# Patient Record
Sex: FEMALE | URBAN - METROPOLITAN AREA
[De-identification: names, ages, dates, MRNs, and addresses within clinical notes are randomized per-mention and may not be internally consistent; named-entity substitution may affect disease eponyms.]

---

## 2022-07-27 ENCOUNTER — OFFICE VISIT (OUTPATIENT)
Dept: URBAN - METROPOLITAN AREA CLINIC 30 | Facility: CLINIC | Age: 82
End: 2022-07-27
Payer: MEDICARE

## 2022-07-27 DIAGNOSIS — H43.391 OTHER VITREOUS OPACITIES, RIGHT EYE: ICD-10-CM

## 2022-07-27 DIAGNOSIS — H02.032 SENILE ENTROPION OF RIGHT LOWER EYELID: Primary | ICD-10-CM

## 2022-07-27 DIAGNOSIS — H01.02A SQUAMOUS BLEPHARITIS RIGHT EYE, UPPER AND LOWER EYELIDS: ICD-10-CM

## 2022-07-27 DIAGNOSIS — H01.02B SQUAMOUS BLEPHARITIS LEFT EYE, UPPER AND LOWER EYELIDS: ICD-10-CM

## 2022-07-27 DIAGNOSIS — H25.813 COMBINED FORMS OF AGE-RELATED CATARACT, BILATERAL: ICD-10-CM

## 2022-07-27 PROCEDURE — 92004 COMPRE OPH EXAM NEW PT 1/>: CPT | Performed by: STUDENT IN AN ORGANIZED HEALTH CARE EDUCATION/TRAINING PROGRAM

## 2022-07-27 PROCEDURE — 92134 CPTRZ OPH DX IMG PST SGM RTA: CPT | Performed by: STUDENT IN AN ORGANIZED HEALTH CARE EDUCATION/TRAINING PROGRAM

## 2022-07-27 ASSESSMENT — INTRAOCULAR PRESSURE
OS: 12
OD: 12

## 2022-07-27 NOTE — IMPRESSION/PLAN
Impression: Squamous blepharitis right eye, upper and lower eyelids: H01.02A.  Plan: see other blepharitis plan

## 2022-07-27 NOTE — IMPRESSION/PLAN
Impression: Combined forms of age-related cataract, bilateral: H25.813. Plan: Cataracts contribute to the patient's reduced vision but ADL not significantly affected. No treatment currently recommended. The patient will monitor vision changes and contact us with any decrease in vision.

## 2022-07-27 NOTE — IMPRESSION/PLAN
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 Plan: Entropion of RLL, greatest nasally. Intermittent, pt ed to cont kamran 2-3x/day prn and will RTC for oculoplx consult next available.

## 2022-07-27 NOTE — IMPRESSION/PLAN
Impression: Squamous blepharitis left eye, upper and lower eyelids: H01.02B.  Plan: Start lid scrubs with baby shampoo or OTC lid scrubs 1-2x/day

## 2022-07-27 NOTE — IMPRESSION/PLAN
Impression: Other vitreous opacities, right eye: H43.391. Plan: Pt ed cause of floater, reassured pt no retinal breaks or detachment. Pt ed symptoms of RD/RT and to seek care immediately if noted. Monitor.

## 2022-12-27 ENCOUNTER — OFFICE VISIT (OUTPATIENT)
Dept: URBAN - METROPOLITAN AREA CLINIC 30 | Facility: CLINIC | Age: 82
End: 2022-12-27
Payer: MEDICARE

## 2022-12-27 DIAGNOSIS — H11.441 CONJUNCTIVAL CYSTS, RIGHT EYE: ICD-10-CM

## 2022-12-27 DIAGNOSIS — H02.032 SENILE ENTROPION OF RIGHT LOWER EYELID: Primary | ICD-10-CM

## 2022-12-27 PROCEDURE — 92285 EXTERNAL OCULAR PHOTOGRAPHY: CPT | Performed by: OPHTHALMOLOGY

## 2022-12-27 PROCEDURE — 99204 OFFICE O/P NEW MOD 45 MIN: CPT | Performed by: OPHTHALMOLOGY

## 2022-12-27 NOTE — IMPRESSION/PLAN
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 Plan: The patient demonstrates otis tarsal entropion, with lashes abrading the cornea/globe, creating a mechanical keratoconjunctivitis. To resolve this, I recommended ATR < 10cm , along with a lateral tarsal strip to elevate/tighten/stabilize the lower lid, and prevent recurrence of entropion. The RBA of surgery were discussed and all questions were answered.

## 2022-12-27 NOTE — IMPRESSION/PLAN
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 Plan: scattered cyst RLL, no scarring or symblepharon on exam today. Will plan for OCP Bx prior to entropion repair if negative okay to move forward with surgery. RBAI d/w patient today. All questions answered. Discussed options for steroids if OCP+ prior to moving forward with surgery.

## 2023-01-24 ENCOUNTER — OFFICE VISIT (OUTPATIENT)
Dept: URBAN - METROPOLITAN AREA CLINIC 30 | Facility: CLINIC | Age: 83
End: 2023-01-24
Payer: MEDICARE

## 2023-01-24 DIAGNOSIS — H11.441 CONJUNCTIVAL CYSTS, RIGHT EYE: ICD-10-CM

## 2023-01-24 DIAGNOSIS — H02.032 SENILE ENTROPION OF RIGHT LOWER EYELID: Primary | ICD-10-CM

## 2023-01-24 PROCEDURE — 68110 EXC LES CONJUNCTIVA <1 CM: CPT | Performed by: OPHTHALMOLOGY

## 2023-01-24 PROCEDURE — 99213 OFFICE O/P EST LOW 20 MIN: CPT | Performed by: OPHTHALMOLOGY

## 2023-01-24 RX ORDER — NEOMYCIN SULFATE, POLYMYXIN B SULFATE AND DEXAMETHASONE 3.5; 10000; 1 MG/G; [USP'U]/G; MG/G
OINTMENT OPHTHALMIC
Qty: 1 | Refills: 1 | Status: ACTIVE
Start: 2023-01-24

## 2023-01-24 NOTE — IMPRESSION/PLAN
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 Plan: The area was cleaned and then anesthetized with 2% lidocaine with epinephrine. The site was excised with Quinten scissors, flush with the adjacent skin. The patient tolerated the procedure well, and the procedure was completed without complications. RLL conjunctival excision [id number] <1cm removed in office today and sent for pathology.

## 2023-03-28 ENCOUNTER — ADULT PHYSICAL (OUTPATIENT)
Dept: URBAN - METROPOLITAN AREA CLINIC 30 | Facility: CLINIC | Age: 83
End: 2023-03-28
Payer: MEDICARE

## 2023-03-28 DIAGNOSIS — Z01.818 ENCOUNTER FOR OTHER PREPROCEDURAL EXAMINATION: Primary | ICD-10-CM

## 2023-03-28 DIAGNOSIS — H02.032 SENILE ENTROPION OF RIGHT LOWER EYELID: ICD-10-CM

## 2023-03-28 PROCEDURE — 99203 OFFICE O/P NEW LOW 30 MIN: CPT | Performed by: REGISTERED NURSE

## 2023-04-24 ENCOUNTER — POST-OPERATIVE VISIT (OUTPATIENT)
Dept: URBAN - METROPOLITAN AREA CLINIC 30 | Facility: CLINIC | Age: 83
End: 2023-04-24
Payer: MEDICARE

## 2023-04-24 DIAGNOSIS — Z48.810 ENCOUNTER FOR SURGICAL AFTERCARE FOLLOWING SURGERY ON A SENSE ORGAN: Primary | ICD-10-CM

## 2023-04-24 PROCEDURE — 99024 POSTOP FOLLOW-UP VISIT: CPT | Performed by: STUDENT IN AN ORGANIZED HEALTH CARE EDUCATION/TRAINING PROGRAM

## 2023-04-24 RX ORDER — NEOMYCIN SULFATE, POLYMYXIN B SULFATE AND DEXAMETHASONE 3.5; 10000; 1 MG/G; [USP'U]/G; MG/G
OINTMENT OPHTHALMIC
Qty: 1 | Refills: 1 | Status: ACTIVE
Start: 2023-04-24

## 2023-04-24 NOTE — IMPRESSION/PLAN
Impression: S/P Lateral Tarsal Strip ; Adjacent Tissue Rearrangement Less Than 10 Square; Centimeters - Right Lower Lid OD - 10 Days. Encounter for surgical aftercare following surgery on a sense organ  Z48.810. Plan: Healing well reassured pt sutures will breakdown with time.  Cont maxitrol as directed